# Patient Record
Sex: FEMALE | Race: WHITE | NOT HISPANIC OR LATINO | Employment: FULL TIME | URBAN - METROPOLITAN AREA
[De-identification: names, ages, dates, MRNs, and addresses within clinical notes are randomized per-mention and may not be internally consistent; named-entity substitution may affect disease eponyms.]

---

## 2021-01-25 ENCOUNTER — TRANSCRIBE ORDERS (OUTPATIENT)
Dept: ADMINISTRATIVE | Age: 26
End: 2021-01-25

## 2021-01-25 ENCOUNTER — LAB (OUTPATIENT)
Dept: LAB | Age: 26
End: 2021-01-25

## 2021-01-25 DIAGNOSIS — Z00.00 ROUTINE GENERAL MEDICAL EXAMINATION AT A HEALTH CARE FACILITY: Primary | ICD-10-CM

## 2021-01-25 DIAGNOSIS — Z00.00 ROUTINE GENERAL MEDICAL EXAMINATION AT A HEALTH CARE FACILITY: ICD-10-CM

## 2021-01-25 LAB — RUBV IGG SERPL IA-ACNC: 36.4 IU/ML

## 2021-01-25 PROCEDURE — 86762 RUBELLA ANTIBODY: CPT

## 2021-01-25 PROCEDURE — 86765 RUBEOLA ANTIBODY: CPT

## 2021-01-25 PROCEDURE — 86787 VARICELLA-ZOSTER ANTIBODY: CPT

## 2021-01-25 PROCEDURE — 86480 TB TEST CELL IMMUN MEASURE: CPT

## 2021-01-25 PROCEDURE — 86735 MUMPS ANTIBODY: CPT

## 2021-01-26 LAB
MEV IGG SER QL: NORMAL
MUV IGG SER QL: NORMAL
VZV IGG SER IA-ACNC: ABNORMAL

## 2021-01-27 LAB
GAMMA INTERFERON BACKGROUND BLD IA-ACNC: 0.02 IU/ML
M TB IFN-G BLD-IMP: NEGATIVE
M TB IFN-G CD4+ BCKGRND COR BLD-ACNC: 0 IU/ML
M TB IFN-G CD4+ BCKGRND COR BLD-ACNC: 0 IU/ML
MITOGEN IGNF BCKGRD COR BLD-ACNC: >10 IU/ML

## 2021-02-11 DIAGNOSIS — Z23 ENCOUNTER FOR IMMUNIZATION: ICD-10-CM

## 2021-02-15 ENCOUNTER — IMMUNIZATIONS (OUTPATIENT)
Dept: FAMILY MEDICINE CLINIC | Facility: HOSPITAL | Age: 26
End: 2021-02-15

## 2021-02-15 DIAGNOSIS — Z23 ENCOUNTER FOR IMMUNIZATION: Primary | ICD-10-CM

## 2021-02-15 PROCEDURE — 0011A SARS-COV-2 / COVID-19 MRNA VACCINE (MODERNA) 100 MCG: CPT

## 2021-02-15 PROCEDURE — 91301 SARS-COV-2 / COVID-19 MRNA VACCINE (MODERNA) 100 MCG: CPT

## 2021-03-25 ENCOUNTER — IMMUNIZATIONS (OUTPATIENT)
Dept: FAMILY MEDICINE CLINIC | Facility: HOSPITAL | Age: 26
End: 2021-03-25

## 2021-03-25 DIAGNOSIS — Z23 ENCOUNTER FOR IMMUNIZATION: Primary | ICD-10-CM

## 2021-03-25 PROCEDURE — 0012A SARS-COV-2 / COVID-19 MRNA VACCINE (MODERNA) 100 MCG: CPT

## 2021-03-25 PROCEDURE — 91301 SARS-COV-2 / COVID-19 MRNA VACCINE (MODERNA) 100 MCG: CPT

## 2022-09-24 ENCOUNTER — APPOINTMENT (OUTPATIENT)
Dept: RADIOLOGY | Facility: MEDICAL CENTER | Age: 27
End: 2022-09-24

## 2022-09-24 ENCOUNTER — HOSPITAL ENCOUNTER (OUTPATIENT)
Facility: HOSPITAL | Age: 27
Setting detail: OBSERVATION
Discharge: HOME | End: 2022-09-24
Attending: EMERGENCY MEDICINE | Admitting: INTERNAL MEDICINE

## 2022-09-24 ENCOUNTER — APPOINTMENT (OUTPATIENT)
Dept: RADIOLOGY | Facility: HOSPITAL | Age: 27
End: 2022-09-24

## 2022-09-24 VITALS
HEIGHT: 65 IN | WEIGHT: 129.63 LBS | TEMPERATURE: 98 F | SYSTOLIC BLOOD PRESSURE: 119 MMHG | RESPIRATION RATE: 16 BRPM | DIASTOLIC BLOOD PRESSURE: 67 MMHG | BODY MASS INDEX: 21.6 KG/M2 | HEART RATE: 81 BPM | OXYGEN SATURATION: 100 %

## 2022-09-24 DIAGNOSIS — R00.2 PALPITATIONS: Primary | ICD-10-CM

## 2022-09-24 DIAGNOSIS — R00.0 SINUS TACHYCARDIA: ICD-10-CM

## 2022-09-24 DIAGNOSIS — I47.1 NARROW COMPLEX TACHYCARDIA: Primary | ICD-10-CM

## 2022-09-24 PROBLEM — Z30.41 USES ORAL CONTRACEPTION: Status: ACTIVE | Noted: 2022-09-24

## 2022-09-24 PROBLEM — R79.89 ELEVATED TSH: Status: ACTIVE | Noted: 2022-09-24

## 2022-09-24 PROBLEM — E87.6 HYPOKALEMIA: Status: ACTIVE | Noted: 2022-09-24

## 2022-09-24 LAB
2HR DELTA HS TROPONIN: 5 NG/L
4HR DELTA HS TROPONIN: 6 NG/L
ANION GAP SERPL CALCULATED.3IONS-SCNC: 6 MMOL/L (ref 4–13)
ANION GAP SERPL CALCULATED.3IONS-SCNC: 9 MMOL/L (ref 4–13)
ATRIAL RATE: 124 BPM
ATRIAL RATE: 136 BPM
ATRIAL RATE: 147 BPM
BASOPHILS # BLD AUTO: 0.08 THOUSANDS/ΜL (ref 0–0.1)
BASOPHILS # BLD AUTO: 0.11 THOUSANDS/ΜL (ref 0–0.1)
BASOPHILS NFR BLD AUTO: 1 % (ref 0–1)
BASOPHILS NFR BLD AUTO: 1 % (ref 0–1)
BUN SERPL-MCNC: 10 MG/DL (ref 5–25)
BUN SERPL-MCNC: 14 MG/DL (ref 5–25)
CALCIUM SERPL-MCNC: 9.2 MG/DL (ref 8.3–10.1)
CALCIUM SERPL-MCNC: 9.7 MG/DL (ref 8.3–10.1)
CARDIAC TROPONIN I PNL SERPL HS: 2 NG/L
CARDIAC TROPONIN I PNL SERPL HS: 7 NG/L
CARDIAC TROPONIN I PNL SERPL HS: 8 NG/L
CHLORIDE SERPL-SCNC: 104 MMOL/L (ref 96–108)
CHLORIDE SERPL-SCNC: 110 MMOL/L (ref 96–108)
CO2 SERPL-SCNC: 25 MMOL/L (ref 21–32)
CO2 SERPL-SCNC: 26 MMOL/L (ref 21–32)
CREAT SERPL-MCNC: 0.65 MG/DL (ref 0.6–1.3)
CREAT SERPL-MCNC: 0.73 MG/DL (ref 0.6–1.3)
D DIMER PPP FEU-MCNC: <0.27 UG/ML FEU
EOSINOPHIL # BLD AUTO: 0.04 THOUSAND/ΜL (ref 0–0.61)
EOSINOPHIL # BLD AUTO: 0.11 THOUSAND/ΜL (ref 0–0.61)
EOSINOPHIL NFR BLD AUTO: 1 % (ref 0–6)
EOSINOPHIL NFR BLD AUTO: 1 % (ref 0–6)
ERYTHROCYTE [DISTWIDTH] IN BLOOD BY AUTOMATED COUNT: 12.2 % (ref 11.6–15.1)
ERYTHROCYTE [DISTWIDTH] IN BLOOD BY AUTOMATED COUNT: 12.2 % (ref 11.6–15.1)
EXT PREG TEST URINE: NEGATIVE
EXT. CONTROL ED NAV: NORMAL
GFR SERPL CREATININE-BSD FRML MDRD: 114 ML/MIN/1.73SQ M
GFR SERPL CREATININE-BSD FRML MDRD: 123 ML/MIN/1.73SQ M
GLUCOSE SERPL-MCNC: 108 MG/DL (ref 65–140)
GLUCOSE SERPL-MCNC: 120 MG/DL (ref 65–140)
HCT VFR BLD AUTO: 34.9 % (ref 34.8–46.1)
HCT VFR BLD AUTO: 37.5 % (ref 34.8–46.1)
HGB BLD-MCNC: 11.4 G/DL (ref 11.5–15.4)
HGB BLD-MCNC: 12.4 G/DL (ref 11.5–15.4)
IMM GRANULOCYTES # BLD AUTO: 0.04 THOUSAND/UL (ref 0–0.2)
IMM GRANULOCYTES # BLD AUTO: 0.04 THOUSAND/UL (ref 0–0.2)
IMM GRANULOCYTES NFR BLD AUTO: 0 % (ref 0–2)
IMM GRANULOCYTES NFR BLD AUTO: 1 % (ref 0–2)
LYMPHOCYTES # BLD AUTO: 1.85 THOUSANDS/ΜL (ref 0.6–4.47)
LYMPHOCYTES # BLD AUTO: 4.15 THOUSANDS/ΜL (ref 0.6–4.47)
LYMPHOCYTES NFR BLD AUTO: 21 % (ref 14–44)
LYMPHOCYTES NFR BLD AUTO: 37 % (ref 14–44)
MCH RBC QN AUTO: 29.2 PG (ref 26.8–34.3)
MCH RBC QN AUTO: 29.6 PG (ref 26.8–34.3)
MCHC RBC AUTO-ENTMCNC: 32.7 G/DL (ref 31.4–37.4)
MCHC RBC AUTO-ENTMCNC: 33.1 G/DL (ref 31.4–37.4)
MCV RBC AUTO: 90 FL (ref 82–98)
MCV RBC AUTO: 90 FL (ref 82–98)
MONOCYTES # BLD AUTO: 0.4 THOUSAND/ΜL (ref 0.17–1.22)
MONOCYTES # BLD AUTO: 0.7 THOUSAND/ΜL (ref 0.17–1.22)
MONOCYTES NFR BLD AUTO: 5 % (ref 4–12)
MONOCYTES NFR BLD AUTO: 6 % (ref 4–12)
NEUTROPHILS # BLD AUTO: 6.2 THOUSANDS/ΜL (ref 1.85–7.62)
NEUTROPHILS # BLD AUTO: 6.36 THOUSANDS/ΜL (ref 1.85–7.62)
NEUTS SEG NFR BLD AUTO: 55 % (ref 43–75)
NEUTS SEG NFR BLD AUTO: 71 % (ref 43–75)
NRBC BLD AUTO-RTO: 0 /100 WBCS
NRBC BLD AUTO-RTO: 0 /100 WBCS
P AXIS: 31 DEGREES
P AXIS: 64 DEGREES
PLATELET # BLD AUTO: 304 THOUSANDS/UL (ref 149–390)
PLATELET # BLD AUTO: 367 THOUSANDS/UL (ref 149–390)
PMV BLD AUTO: 10.8 FL (ref 8.9–12.7)
PMV BLD AUTO: 10.8 FL (ref 8.9–12.7)
POTASSIUM SERPL-SCNC: 3 MMOL/L (ref 3.5–5.3)
POTASSIUM SERPL-SCNC: 4.1 MMOL/L (ref 3.5–5.3)
PR INTERVAL: 134 MS
PR INTERVAL: 296 MS
QRS AXIS: 83 DEGREES
QRS AXIS: 86 DEGREES
QRS AXIS: 88 DEGREES
QRSD INTERVAL: 86 MS
QRSD INTERVAL: 88 MS
QRSD INTERVAL: 98 MS
QT INTERVAL: 280 MS
QT INTERVAL: 294 MS
QT INTERVAL: 300 MS
QTC INTERVAL: 431 MS
QTC INTERVAL: 438 MS
QTC INTERVAL: 442 MS
RBC # BLD AUTO: 3.9 MILLION/UL (ref 3.81–5.12)
RBC # BLD AUTO: 4.19 MILLION/UL (ref 3.81–5.12)
SODIUM SERPL-SCNC: 139 MMOL/L (ref 135–147)
SODIUM SERPL-SCNC: 141 MMOL/L (ref 135–147)
T WAVE AXIS: -41 DEGREES
T WAVE AXIS: -41 DEGREES
T WAVE AXIS: 33 DEGREES
TSH SERPL DL<=0.05 MIU/L-ACNC: 6.18 UIU/ML (ref 0.45–4.5)
VENTRICULAR RATE: 124 BPM
VENTRICULAR RATE: 136 BPM
VENTRICULAR RATE: 147 BPM
WBC # BLD AUTO: 11.31 THOUSAND/UL (ref 4.31–10.16)
WBC # BLD AUTO: 8.77 THOUSAND/UL (ref 4.31–10.16)

## 2022-09-24 PROCEDURE — 93010 ELECTROCARDIOGRAM REPORT: CPT | Performed by: INTERNAL MEDICINE

## 2022-09-24 PROCEDURE — 96374 THER/PROPH/DIAG INJ IV PUSH: CPT

## 2022-09-24 PROCEDURE — 84484 ASSAY OF TROPONIN QUANT: CPT

## 2022-09-24 PROCEDURE — 99285 EMERGENCY DEPT VISIT HI MDM: CPT

## 2022-09-24 PROCEDURE — 99285 EMERGENCY DEPT VISIT HI MDM: CPT | Performed by: EMERGENCY MEDICINE

## 2022-09-24 PROCEDURE — 85379 FIBRIN DEGRADATION QUANT: CPT

## 2022-09-24 PROCEDURE — 80048 BASIC METABOLIC PNL TOTAL CA: CPT

## 2022-09-24 PROCEDURE — 84443 ASSAY THYROID STIM HORMONE: CPT

## 2022-09-24 PROCEDURE — 85025 COMPLETE CBC W/AUTO DIFF WBC: CPT

## 2022-09-24 PROCEDURE — 81025 URINE PREGNANCY TEST: CPT

## 2022-09-24 PROCEDURE — 71045 X-RAY EXAM CHEST 1 VIEW: CPT

## 2022-09-24 PROCEDURE — 93005 ELECTROCARDIOGRAM TRACING: CPT

## 2022-09-24 PROCEDURE — 96361 HYDRATE IV INFUSION ADD-ON: CPT

## 2022-09-24 PROCEDURE — 99204 OFFICE O/P NEW MOD 45 MIN: CPT | Performed by: INTERNAL MEDICINE

## 2022-09-24 PROCEDURE — 36415 COLL VENOUS BLD VENIPUNCTURE: CPT

## 2022-09-24 RX ORDER — DESOGESTREL AND ETHINYL ESTRADIOL 0.15-0.03
1 KIT ORAL EVERY MORNING
COMMUNITY
Start: 2022-09-14 | End: 2023-09-14

## 2022-09-24 RX ORDER — POTASSIUM CHLORIDE 20 MEQ/1
40 TABLET, EXTENDED RELEASE ORAL ONCE
Status: DISCONTINUED | OUTPATIENT
Start: 2022-09-24 | End: 2022-09-24

## 2022-09-24 RX ORDER — LORAZEPAM 2 MG/ML
0.5 INJECTION INTRAMUSCULAR ONCE
Status: COMPLETED | OUTPATIENT
Start: 2022-09-24 | End: 2022-09-24

## 2022-09-24 RX ORDER — POTASSIUM CHLORIDE 20MEQ/15ML
40 LIQUID (ML) ORAL ONCE
Status: COMPLETED | OUTPATIENT
Start: 2022-09-24 | End: 2022-09-24

## 2022-09-24 RX ORDER — POTASSIUM CHLORIDE 20 MEQ/1
20 TABLET, EXTENDED RELEASE ORAL ONCE
Status: COMPLETED | OUTPATIENT
Start: 2022-09-24 | End: 2022-09-24

## 2022-09-24 RX ADMIN — POTASSIUM CHLORIDE 40 MEQ: 20 SOLUTION ORAL at 03:23

## 2022-09-24 RX ADMIN — SODIUM CHLORIDE 1000 ML: 0.9 INJECTION, SOLUTION INTRAVENOUS at 02:10

## 2022-09-24 RX ADMIN — POTASSIUM CHLORIDE 20 MEQ: 1500 TABLET, EXTENDED RELEASE ORAL at 06:44

## 2022-09-24 RX ADMIN — LORAZEPAM 0.5 MG: 2 INJECTION INTRAMUSCULAR; INTRAVENOUS at 02:07

## 2022-09-24 NOTE — CONSULTS
Consult - Cardiology   Mira Sánchez 24 y o  female MRN: [id number]  Unit/Bed#: E4 -01 Encounter: [id number]        Reason For Consult:  tachycardia                 Assessment:  Regular, long RP tachycardia without sign of pre-excitation on non tachycardic ECG  Among differential diagnoses current data would suggest - atrial tachycardia, inappropriate sinus tachycardia, or possibly AVNRT    Plan:  · Check echocardiogram ~~> can be done as an outpatient, order placed   Would defer initiation of Rx at this point   Patient advised against stimulants or other potential triggers for tachyarrhythmia including alcohol   Outpatient referral to electrophysiology ~~> order placed      History Of Present Illness:  Mira Sánchez is a very pleasant 72-year-old with no known medical problems who 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  Last night, while at work, the patient had just sat down to do some computer charting when she had the acute recognition of her heart beating rapidly and forcefully  Be on recognition of her tachycardia she was otherwise asymptomatic denying any acute lightheadedness, near-syncope, syncope, chest discomfort, dyspnea, diaphoresis, or otherwise  She had never experienced anything like this before and was appropriately concerned  Coworkers had taken her pulse confirming a tachycardia for which she then went to the emergency department to be evaluated  An EKG was obtained showing a regular narrow complex tachycardia and she was admitted for further observation  As mentioned above the patient has not had any prior similar symptoms  She has no family history of dysrhythmia, sudden cardiac death, or other known cardiac problems  She is a fit young woman routinely exercises with her regimen typically including 30 minutes of aerobic activity on either a treadmill or elliptical which she tolerates without difficulty    She drinks modest (less than 20 oz) amounts of coffee on an infrequent basis  She has not drink any other caffeine based or other stimulant beverages, or use other stimulant product  Her alcohol consumption is modest   She takes only birth control which she states she has been taking for several months  In June of this year she did have COVID infection but did not require hospitalization  Since admission the following are noted:  TSH slightly abnormal (6 17) with presenting potassium of 4 1 later repeated at 4 1 with otherwise unremarkable chemistries Baseline ECG without evidence of pre-excitation  Since application of telemetry the patient has had heart rate variability with heart rate range 60s-160 and recurrent short-lived episodes of tachycardia  Somewhat interestingly she seems to have not had recognition of these instances  Fastest telemetry episode of tachycardia occurring around 0530 this morning shows regular tachycardia around 160 beats per minute with no abrupt onset or termination with some difficulty discerning P-wave (see example #1 below)  There other instances of regular long RP tachycardia where P-waves are clearly evident (see example #2), and some which may suggest P-wave morphology variability (see examples 3 and 4 below)        Past Medical History:        History reviewed  No pertinent past medical history  History reviewed  No pertinent surgical history  Allergy:        Allergies   Allergen Reactions    Diphenhydramine Hives       Medications:       Prior to Admission medications    Medication Sig Start Date End Date Taking? Authorizing Provider   desogestrel-ethinyl estradiol (APRI) 0 15-30 MG-MCG per tablet Take 1 tablet by mouth every morning 9/14/22 9/14/23 Yes Historical Provider, MD       Family History:     History reviewed  No pertinent family history       Social History:       Social History     Socioeconomic History    Marital status: Single     Spouse name: None    Number of children: None    Years of education: None    Highest education level: None   Occupational History    None   Tobacco Use    Smoking status: Never Smoker    Smokeless tobacco: Never Used   Vaping Use    Vaping Use: Never used   Substance and Sexual Activity    Alcohol use: Not Currently    Drug use: Never    Sexual activity: None   Other Topics Concern    None   Social History Narrative    None     Social Determinants of Health     Financial Resource Strain: Not on file   Food Insecurity: Not on file   Transportation Needs: No Transportation Needs    Lack of Transportation (Medical): No    Lack of Transportation (Non-Medical): No   Physical Activity: Not on file   Stress: Not on file   Social Connections: Not on file   Intimate Partner Violence: Not on file   Housing Stability: Not on file       ROS:  Symptoms per HPI  The remainder of the review of systems is negative    Exam:  General:  Alert, normally conversant, comfortable appearing  Head: Normocephalic, atraumatic  Eyes:  EOMI  Pupils - equal, round, reactive to accomodation  No icterus  Normal Conjunctiva  Oropharynx: Moist without lesion  Neck:  No gross bruit, JVD, thyromegaly, or lymphadenopathy  Heart:  Regular with controlled rate  No rub nor pathologic murmur  Lungs:  Clear without rales/rhonchi/wheeze  Abdomen:  Soft and nontender with normal bowel sounds  No organomegaly or mass  Lower Limbs:  No edema  Pulses[de-identified]  RLE - DP:  2+                 LLE - DP:  2+  Musculoskeletal: Independent movement of limbs observed, Formal ROM and strength eval not performed  Neurologic:    Oriented to: person, place, situation  Cranial Nerves: grossly intact - vision, smell, taste, and hearing were not tested       Motor function: grossly normal, symmetric   Sensation: Was not tested    Vitals:    09/24/22 0414 09/24/22 0427 09/24/22 0700 09/24/22 1100   BP: 136/86 126/81 121/71 119/67   BP Location: Right arm Right arm Right arm Right arm   Pulse: (!) 119 (!) 121 78 81 Resp:  18 16 16   Temp:  98 °F (36 7 °C) 98 4 °F (36 9 °C) 98 °F (36 7 °C)   TempSrc:  Temporal Temporal Temporal   SpO2: 100% 100% 98% 100%   Weight:  58 8 kg (129 lb 10 1 oz)     Height:  5' 5" (1 651 m)             DATA:      -----------    Telemetry:       Example #1         Example #2        Example #3      Example #4            -----------------------------------------------------------------------------------------------------------------------------------------------  Echocardiogram    -----------------------------------------------------------------------------------------------------------------------------------------------  Ischemic Testing:  Stress test         Catheterization    -----------------------------------------------------------------------------------------------------------------------------------------------  Weights: Wt Readings from Last 20 Encounters:   09/24/22 58 8 kg (129 lb 10 1 oz)   , Body mass index is 21 57 kg/m²           Lab Studies:               Results from last 7 days   Lab Units 09/24/22  0610 09/24/22  0208   WBC Thousand/uL 8 77 11 31*   HEMOGLOBIN g/dL 11 4* 12 4   HEMATOCRIT % 34 9 37 5   PLATELETS Thousands/uL 304 367   ,   Results from last 7 days   Lab Units 09/24/22  0610 09/24/22  0208   POTASSIUM mmol/L 4 1 3 0*   CHLORIDE mmol/L 110* 104   CO2 mmol/L 25 26   BUN mg/dL 10 14   CREATININE mg/dL 0 65 0 73   CALCIUM mg/dL 9 2 9 7

## 2022-09-24 NOTE — Clinical Note
Gay Brandiesonny was seen and treated in our emergency department on 9/24/2022  No restrictions            Diagnosis:     Rocky Garcia  may return to work on return date  She may return on this date: 09/26/2022         If you have any questions or concerns, please don't hesitate to call        Josey Rod, DO    ______________________________           _______________          _______________  Hospital Representative                              Date                                Time

## 2022-09-24 NOTE — DISCHARGE SUMMARY
[facility]  Discharge- Ernesto Dempsey 1995, 24 y o  female MRN: [id number]  Unit/Bed#: E4 -01 Encounter: [id number]  Primary Care Provider: No primary care provider on file  Date and time admitted to hospital: 9/24/2022  1:31 AM    Elevated TSH  Assessment & Plan  · TSH 6 178  · Suspect related to recent covid infection in august    Uses oral contraception  Assessment & Plan  · D-dimer negative   · Continue on oral contraceptives     Hypokalemia  Assessment & Plan  · Patient with potassium 3 0 given 40 mEq in ED   · Additional 20 mEq given   · Monitor BMP    * Inappropriate sinus tachycardia  Assessment & Plan  · 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 found to have -140s  · Patient was given IVF and ativan in ED without resolution of tachycardia   · Tachycardia likely unrelated to anxiety, volume depletion, or infection  · Monitor on tele - sinus tachycardia until 5 am with conversion to normal sinus rhythm  · Cardiology recommendations noted  Suspect atrial tachycardia, inappropriate sinus tachycardia, or possibly AVNRT at this time  Requesting that patient undergo outpatient TTE, EP follow-up (orders placed by Cardiology service)  · Advised to avoid any stimulants to prevent recurrence      Medical Problems             Resolved Problems  Date Reviewed: 9/24/2022   None               Discharging Physician / Practitioner: Yenny Gonsalez MD  PCP: No primary care provider on file    Admission Date:   Admission Orders (From admission, onward)     Ordered        09/24/22 0400  Place in Observation  Once                      Discharge Date: 09/24/22    Consultations During Hospital Stay:  · Cardiology    Procedures Performed:   · None    Significant Findings / Test Results:   XR chest 1 view portable   ED Interpretation by Ryan Hartman DO (09/24 3973)   NO acute cardiopulmonary disease as read by me Final Result by Davonte Haile MD (09/24 1649)      No radiographic evidence of acute intrathoracic process  Workstation performed: [id number]             Incidental Findings:   · None     Test Results Pending at Discharge (will require follow up): · None     Outpatient Tests Requested:  · TTE    Complications:  None    Reason for Admission:  Sinus tachycardia    Hospital Course:   Esther Dolan is a 24 y o  female patient who originally presented to the hospital on 9/24/2022 due to persistent palpitations  Monitored on telemetry which revealed sinus tachycardia at 5:00 a m  Morning of admission and spontaneous resolution and conversion back to normal sinus rhythm  Patient asymptomatic at the time of encounter  Cardiology consulted on the case and considering possible atrial tachycardia, inappropriate sinus tachycardia, or possibly AVNRT as potential causes of patient's symptoms  No further inpatient testing as per Cardiology  Patient requested to undergo outpatient TTE and advised to follow-up with Electrophysiology (referral ordered by Cardiology)  Patient is stable for discharge home at this time  Patient also advised follow-up with her primary care provider within 7 days of discharge  Please see above list of diagnoses and related plan for additional information  Condition at Discharge: stable    Discharge Day Visit / Exam:   Subjective:  Patient seen and examined  Denies any complaints at this time  Endorses resolution of palpitations  Denies any lightheadedness or chest pain or shortness of breath      Vitals: Blood Pressure: 119/67 (09/24/22 1100)  Pulse: 81 (09/24/22 1100)  Temperature: 98 °F (36 7 °C) (09/24/22 1100)  Temp Source: Temporal (09/24/22 1100)  Respirations: 16 (09/24/22 1100)  Height: 5' 5" (165 1 cm) (09/24/22 0427)  Weight - Scale: 58 8 kg (129 lb 10 1 oz) (09/24/22 0427)  SpO2: 100 % (09/24/22 1100)    Exam:   Physical Exam  Vitals and nursing note reviewed  Constitutional:       General: She is not in acute distress  Appearance: She is well-developed  HENT:      Head: Normocephalic and atraumatic  Eyes:      Conjunctiva/sclera: Conjunctivae normal    Cardiovascular:      Rate and Rhythm: Normal rate and regular rhythm  Heart sounds: No murmur heard  Pulmonary:      Effort: Pulmonary effort is normal  No respiratory distress  Breath sounds: Normal breath sounds  Abdominal:      Palpations: Abdomen is soft  Tenderness: There is no abdominal tenderness  Musculoskeletal:      Cervical back: Neck supple  Skin:     General: Skin is warm and dry  Neurological:      Mental Status: She is alert  Discussion with Family: Updated  (significant other and father) at bedside  Discharge instructions/Information to patient and family:   See after visit summary for information provided to patient and family  Provisions for Follow-Up Care:  See after visit summary for information related to follow-up care and any pertinent home health orders  Disposition:   Home    Planned Readmission:  None     Discharge Statement:  I spent 45 minutes discharging the patient  This time was spent on the day of discharge  I had direct contact with the patient on the day of discharge  Greater than 50% of the total time was spent examining patient, answering all patient questions, arranging and discussing plan of care with patient as well as directly providing post-discharge instructions  Additional time then spent on discharge activities  Discharge Medications:  See after visit summary for reconciled discharge medications provided to patient and/or family        **Please Note: This note may have been constructed using a voice recognition system**

## 2022-09-24 NOTE — ED PROVIDER NOTES
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, and while she was charting on the patient, she reports feeling sudden onset palpitations described as heart fluttering and pounding  Patient became very anxious, mildly breathless, with trembling of her upper extremities  She told her supervisor and she was referred to the ED for further treatment  Patient states that she does have infrequent anxiety attacks, but never this severe  Patient does note that she started birth control 3 months ago  She currently denies any chest pain or pleuritic symptoms, difficulty breathing  She reports no history of thyroid disorders  Patient admits to feeling anxious but does not identify potential trigger  She reports no history recent viral illnesses, abdominal pain, nausea, vomiting, diarrhea, pregnancy, urinary complaints  Also denies calf pain or swelling, recent long car rides or plane flights, history of cancer or active chemotherapy treatments, prior clotting disorder, prolonged immobilization, recent surgery  No family history of blood clot disorders or cardiac disease  No history of recent syncope  Prior to Admission Medications   Prescriptions Last Dose Informant Patient Reported? Taking?   desogestrel-ethinyl estradiol (APRI) 0 15-30 MG-MCG per tablet   Yes Yes   Sig: Take 1 tablet by mouth every morning      Facility-Administered Medications: None       History reviewed  No pertinent past medical history  History reviewed  No pertinent surgical history  History reviewed   No pertinent family history  I have reviewed and agree with the history as documented  E-Cigarette/Vaping    E-Cigarette Use Never User      E-Cigarette/Vaping Substances     Social History     Tobacco Use    Smoking status: Never Smoker    Smokeless tobacco: Never Used   Vaping Use    Vaping Use: Never used   Substance Use Topics    Alcohol use: Not Currently    Drug use: Never        Review of Systems   Constitutional: Negative for activity change, appetite change, chills, fatigue and fever  HENT: Negative for congestion, ear pain, sinus pressure, sinus pain and sore throat  Eyes: Negative for pain and visual disturbance  Respiratory: Negative for cough, chest tightness and shortness of breath  Cardiovascular: Positive for palpitations  Negative for chest pain and leg swelling  Gastrointestinal: Negative for abdominal pain, diarrhea, nausea and vomiting  Genitourinary: Negative for difficulty urinating, dysuria, flank pain, hematuria and urgency  Musculoskeletal: Negative for arthralgias and back pain  Skin: Negative for color change and rash  Neurological: Negative for seizures and syncope  Psychiatric/Behavioral: The patient is nervous/anxious  All other systems reviewed and are negative  Physical Exam  ED Triage Vitals   Temperature Pulse Respirations Blood Pressure SpO2   09/24/22 0245 09/24/22 0136 09/24/22 0136 09/24/22 0136 09/24/22 0136   98 1 °F (36 7 °C) (!) 140 18 149/92 100 %      Temp Source Heart Rate Source Patient Position - Orthostatic VS BP Location FiO2 (%)   09/24/22 0245 09/24/22 0136 09/24/22 0136 09/24/22 0136 --   Oral Monitor Lying Right arm       Pain Score       09/24/22 0136       No Pain             Orthostatic Vital Signs  Vitals:    09/24/22 0305 09/24/22 0315 09/24/22 0414 09/24/22 0427   BP:  135/94 136/86 126/81   Pulse: (!) 130 (!) 138 (!) 119 (!) 121   Patient Position - Orthostatic VS: Lying Lying  Lying       Physical Exam  Vitals and nursing note reviewed  Constitutional:       General: She is not in acute distress  Appearance: Normal appearance  She is well-developed and normal weight  She is not ill-appearing or toxic-appearing  Comments: Anxious     HENT:      Head: Normocephalic and atraumatic  Right Ear: External ear normal       Left Ear: External ear normal       Nose: Nose normal  No congestion  Mouth/Throat:      Mouth: Mucous membranes are moist       Pharynx: Oropharynx is clear  Eyes:      General: No scleral icterus  Extraocular Movements: Extraocular movements intact  Conjunctiva/sclera: Conjunctivae normal       Pupils: Pupils are equal, round, and reactive to light  Cardiovascular:      Rate and Rhythm: Regular rhythm  Tachycardia present  Pulses: Normal pulses  Heart sounds: Normal heart sounds  No murmur heard  Pulmonary:      Effort: Pulmonary effort is normal  No respiratory distress  Breath sounds: Normal breath sounds  No rhonchi  Chest:      Chest wall: No tenderness  Abdominal:      General: Abdomen is flat  Palpations: Abdomen is soft  Tenderness: There is no abdominal tenderness  Musculoskeletal:         General: No tenderness  Normal range of motion  Cervical back: Normal range of motion and neck supple  Skin:     General: Skin is warm and dry  Capillary Refill: Capillary refill takes less than 2 seconds  Findings: No erythema or rash  Neurological:      General: No focal deficit present  Mental Status: She is alert and oriented to person, place, and time  Cranial Nerves: No cranial nerve deficit  Sensory: No sensory deficit  Motor: No weakness  Coordination: Coordination normal    Psychiatric:         Mood and Affect: Mood is anxious           ED Medications  Medications   potassium chloride (K-DUR,KLOR-CON) CR tablet 20 mEq (has no administration in time range)   sodium chloride 0 9 % bolus 1,000 mL (0 mL Intravenous Stopped 9/24/22 0567)   LORazepam (ATIVAN) injection 0 5 mg (0 5 mg Intravenous Given 9/24/22 0207)   potassium chloride oral solution 40 mEq (40 mEq Oral Given 9/24/22 0323)       Diagnostic Studies  Results Reviewed     Procedure Component Value Units Date/Time    HS Troponin I 2hr [[id number]]  (Normal) Collected: 09/24/22 0417    Lab Status: Final result Specimen: Blood from Arm, Right Updated: 09/24/22 0451     hs TnI 2hr 7 ng/L      Delta 2hr hsTnI 5 ng/L     HS Troponin I 4hr [[id number]]     Lab Status: No result Specimen: Blood     Basic metabolic panel [[id number]]  (Abnormal) Collected: 09/24/22 0208    Lab Status: Final result Specimen: Blood from Arm, Left Updated: 09/24/22 0300     Sodium 139 mmol/L      Potassium 3 0 mmol/L      Chloride 104 mmol/L      CO2 26 mmol/L      ANION GAP 9 mmol/L      BUN 14 mg/dL      Creatinine 0 73 mg/dL      Glucose 108 mg/dL      Calcium 9 7 mg/dL      eGFR 114 ml/min/1 73sq m     Narrative:      [place] guidelines for Chronic Kidney Disease (CKD):     Stage 1 with normal or high GFR (GFR > 90 mL/min/1 73 square meters)    Stage 2 Mild CKD (GFR = 60-89 mL/min/1 73 square meters)    Stage 3A Moderate CKD (GFR = 45-59 mL/min/1 73 square meters)    Stage 3B Moderate CKD (GFR = 30-44 mL/min/1 73 square meters)    Stage 4 Severe CKD (GFR = 15-29 mL/min/1 73 square meters)    Stage 5 End Stage CKD (GFR <15 mL/min/1 73 square meters)  Note: GFR calculation is accurate only with a steady state creatinine    TSH [[id number]]  (Abnormal) Collected: 09/24/22 0208    Lab Status: Final result Specimen: Blood from Arm, Left Updated: 09/24/22 0300     TSH 3RD GENERATON 6 178 uIU/mL     Narrative:      Patients undergoing fluorescein dye angiography may retain small amounts of fluorescein in the body for 48-72 hours post procedure  Samples containing fluorescein can produce falsely depressed TSH values   If the patient had this procedure,a specimen should be resubmitted post fluorescein clearance  D-Dimer [[id number]]  (Normal) Collected: 09/24/22 0208    Lab Status: Final result Specimen: Blood from Arm, Left Updated: 09/24/22 0250     D-Dimer, Quant <0 27 ug/ml FEU     HS Troponin 0hr (reflex protocol) [[id number]]  (Normal) Collected: 09/24/22 0213    Lab Status: Final result Specimen: Blood from Arm, Left Updated: 09/24/22 0242     hs TnI 0hr 2 ng/L     CBC and differential [[id number]]  (Abnormal) Collected: 09/24/22 0208    Lab Status: Final result Specimen: Blood from Arm, Left Updated: 09/24/22 0216     WBC 11 31 Thousand/uL      RBC 4 19 Million/uL      Hemoglobin 12 4 g/dL      Hematocrit 37 5 %      MCV 90 fL      MCH 29 6 pg      MCHC 33 1 g/dL      RDW 12 2 %      MPV 10 8 fL      Platelets 648 Thousands/uL      nRBC 0 /100 WBCs      Neutrophils Relative 55 %      Immat GRANS % 0 %      Lymphocytes Relative 37 %      Monocytes Relative 6 %      Eosinophils Relative 1 %      Basophils Relative 1 %      Neutrophils Absolute 6 20 Thousands/µL      Immature Grans Absolute 0 04 Thousand/uL      Lymphocytes Absolute 4 15 Thousands/µL      Monocytes Absolute 0 70 Thousand/µL      Eosinophils Absolute 0 11 Thousand/µL      Basophils Absolute 0 11 Thousands/µL     POCT pregnancy, urine [[id number]]  (Normal) Resulted: 09/24/22 0214    Lab Status: Final result Updated: 09/24/22 0214     EXT PREG TEST UR (Ref: Negative) negative     Control valid                 XR chest 1 view portable   ED Interpretation by Todd Hdz DO (09/24 0386)   NO acute cardiopulmonary disease as read by me            Procedures  Procedures      ED Course  ED Course as of 09/24/22 0553   Sat Sep 24, 2022   0245 hs TnI 0hr: 2   0315 Potassium(!): 3 0  Will replete with PO    0322 D-Dimer, Quant: <0 27        EKG shows sinus tachycardia at 146 beats per minute, rate-related ST changes, normal axis, normal intervals      Given acute nature of patient's palpitations in the context of OCP use, there is concern for possible pulmonary embolism  Etiology could also include abnormalities of thyroid function, electrolyte imbalance, less likely to be ACS  Will do cardiac labs, D-dimer, electrolyte panel  Patient was given IV fluids and Ativan 0 5 mg IV for anxiety  On re-evaluation, patient resting comfortably  Remains tachycardic in the 120s-130s  No chest pain  Repeat EKG shows ST at 136 bpm,normal axis, rate related ST changes  Initial troponin is 2  Will do delta because onset of palpations within 3 hours of lab draw  Diagnostics reviewed  Patient is hypokalemic at 3 0, will replete with oral therapy  Her TSH is mildly elevated, possibly hypothyroid, but this would not explain patient's tachycardia  She remains tachycardic in the 120s to 130s  Oxygen saturation and blood pressure are normal   Her D-dimer is negative, excluding the the likelihood of PE  Chest x-ray shows no acute disease  Given patient's new onset tachycardia, will admit for cardiac evaluation  Patient is agreeable with this plan  Case was discussed with jakob, arrangements made for admission  SBIRT 20yo+    Flowsheet Row Most Recent Value   SBIRT (25 yo +)    In order to provide better care to our patients, we are screening all of our patients for alcohol and drug use  Would it be okay to ask you these screening questions?  No Filed at: 09/24/2022 0143                MDM    Disposition  Final diagnoses:   Palpitations   Sinus tachycardia     Time reflects when diagnosis was documented in both MDM as applicable and the Disposition within this note     Time User Action Codes Description Comment    9/24/2022  3:50 AM Jana Montaño [R00 2] Palpitations     9/24/2022  3:50 AM Jana Montaño [R00 0] Sinus tachycardia       ED Disposition     ED Disposition   Admit    Condition   Stable    Date/Time   Sat Sep 24, 2022  3:54 AM    Comment   Case was discussed with Perry Ybarra and the patient's admission status was agreed to be Admission Status: observation status to the [address] [place] [address] service  Follow-up Information    None         Current Discharge Medication List      CONTINUE these medications which have NOT CHANGED    Details   desogestrel-ethinyl estradiol (APRI) 0 15-30 MG-MCG per tablet Take 1 tablet by mouth every morning           No discharge procedures on file  PDMP Review     None           ED Provider  Attending physically available and evaluated Jaimehunter Hollidaydanyel SWARTZ managed the patient along with the ED Attending      Electronically Signed by         Jose Brooks DO  09/24/22 1053

## 2022-09-24 NOTE — DISCHARGE INSTRUCTIONS
You were admitted for possible atrial tachycardia, inappropriate sinus tachycardia, or possibly AVNRT  Cardiology is recommending that you continue to follow-up with them as outpatient and are recommending for you to undergo an outpatient echocardiogram (order has been placed into the system)  Given your symptoms, it is advisable that you avoid any food/beverages that can lead to excitation of your heart (stimulants) such as coffee, alcohol, etc  You have also been referred to electrophysiology to see if you would need further work-up, monitoring or intervention  Please see your PCP within 1 week of discharge

## 2022-09-24 NOTE — H&P
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  · Patient was given IVF and ativan in ED without resolution of tachycardia   · Tachycardia likely unrelated to anxiety, volume depletion, or infection  · Monitor on tele  · Cardiology consulted  · Suspect need for EP     Hypokalemia  Assessment & Plan  · Patient with potassium 3 0 given 40 mEq in ED   · Additional 20 mEq given   · Monitor BMP    Elevated TSH  Assessment & Plan  · TSH 6 178  · Suspect related to recent covid infection in august    Uses oral contraception  Assessment & Plan  · D-dimer negative   · Continue on oral contraceptives     VTE Pharmacologic Prophylaxis: VTE Score: 1 Low Risk (Score 0-2) - Encourage Ambulation  Code Status: Level 1 - Full Code   Discussion with family: Updated  (fiance) at bedside  Anticipated Length of Stay: Patient will be admitted on an observation basis with an anticipated length of stay of less than 2 midnights secondary to sinus tachycardia requiring monitoring on telementry and cardiac consult  Total Time for Visit, including Counseling / Coordination of Care: 60 minutes Greater than 50% of this total time spent on direct patient counseling and coordination of care  Chief Complaint: palpitations    History of Present Illness:  Ollie Raman is a 24 y o  female with no PMH who presents with sudden onset of palpitations  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 at 1:30 AM  Co-workers reported that the patient appeared pale   The palpitations continued until patient presented to the ED  Patient was found to be tachycardiac with heart rate in 110-140s  She was given ativan and IV fluids without resolution of tachycardia  She denies any history of anxiety or panic attacks  She states having felt palpitations before but never for the length of time experienced today  She denies any associated symptoms with episode such as nausea, vomiting, blurred vision,dizziness, chest pain, SOB  Review of Systems:  Review of Systems   Constitutional: Negative for chills and fever  HENT: Negative for sinus pain, sneezing and sore throat  Eyes: Negative for visual disturbance  Respiratory: Negative for shortness of breath  Cardiovascular: Positive for palpitations  Negative for chest pain  Gastrointestinal: Negative for abdominal pain, nausea and vomiting  Neurological: Negative for dizziness and light-headedness  Psychiatric/Behavioral: The patient is not nervous/anxious  Past Medical and Surgical History:   History reviewed  No pertinent past medical history  History reviewed  No pertinent surgical history  Meds/Allergies:  Prior to Admission medications    Medication Sig Start Date End Date Taking? Authorizing Provider   desogestrel-ethinyl estradiol (APRI) 0 15-30 MG-MCG per tablet Take 1 tablet by mouth every morning 9/14/22 9/14/23 Yes Historical Provider, MD     I have reviewed home medications with patient personally  Allergies:    Allergies   Allergen Reactions    Diphenhydramine Hives       Social History:  Marital Status: Single   Occupation: OB nurse  Patient Pre-hospital Living Situation: Home with [facility]  Patient Pre-hospital Level of Mobility: walks  Patient Pre-hospital Diet Restrictions: none  Substance Use History:   Social History     Substance and Sexual Activity   Alcohol Use Not Currently     Social History     Tobacco Use   Smoking Status Never Smoker   Smokeless Tobacco Never Used     Social History     Substance and Sexual Activity   Drug Use Never Family History:  History reviewed  No pertinent family history  Physical Exam:     Vitals:   Blood Pressure: 136/86 (09/24/22 0414)  Pulse: (!) 119 (09/24/22 0414)  Temperature: 98 1 °F (36 7 °C) (09/24/22 0245)  Temp Source: Oral (09/24/22 0245)  Respirations: 16 (09/24/22 0315)  Weight - Scale: 60 7 kg (133 lb 13 1 oz) (09/24/22 0136)  SpO2: 100 % (09/24/22 0414)    Physical Exam  Constitutional:       General: She is not in acute distress  Appearance: Normal appearance  HENT:      Head: Normocephalic and atraumatic  Eyes:      General: No scleral icterus  Cardiovascular:      Rate and Rhythm: Tachycardia present  Pulmonary:      Effort: Pulmonary effort is normal       Breath sounds: Normal breath sounds  No wheezing  Abdominal:      General: Abdomen is flat  Bowel sounds are normal       Palpations: Abdomen is soft  Tenderness: There is no abdominal tenderness  There is no guarding  Musculoskeletal:      Right lower leg: No edema  Left lower leg: No edema  Skin:     General: Skin is warm and dry  Neurological:      Mental Status: She is alert  Mental status is at baseline     Psychiatric:         Mood and Affect: Mood normal          Behavior: Behavior normal           Additional Data:     Lab Results:  Results from last 7 days   Lab Units 09/24/22  0208   WBC Thousand/uL 11 31*   HEMOGLOBIN g/dL 12 4   HEMATOCRIT % 37 5   PLATELETS Thousands/uL 367   NEUTROS PCT % 55   LYMPHS PCT % 37   MONOS PCT % 6   EOS PCT % 1     Results from last 7 days   Lab Units 09/24/22  0208   SODIUM mmol/L 139   POTASSIUM mmol/L 3 0*   CHLORIDE mmol/L 104   CO2 mmol/L 26   BUN mg/dL 14   CREATININE mg/dL 0 73   ANION GAP mmol/L 9   CALCIUM mg/dL 9 7   GLUCOSE RANDOM mg/dL 108                       Imaging: Reviewed radiology reports from this admission including: chest xray  XR chest 1 view portable   ED Interpretation by Ish Kimble DO (09/24 4236)   NO acute cardiopulmonary disease as read by me          EKG and Other Studies Reviewed on Admission:   · EKG: Sinus Tachycardia    ** Please Note: This note has been constructed using a voice recognition system   **

## 2022-09-24 NOTE — PLAN OF CARE
Problem: CARDIOVASCULAR - ADULT  Goal: Maintains optimal cardiac output and hemodynamic stability  Description: INTERVENTIONS:  - Monitor I/O, vital signs and rhythm  - Monitor for S/S and trends of decreased cardiac output  - Administer and titrate ordered vasoactive medications to optimize hemodynamic stability  - Assess quality of pulses, skin color and temperature  - Assess for signs of decreased coronary artery perfusion  - Instruct patient to report change in severity of symptoms  Outcome: Progressing  Goal: Absence of cardiac dysrhythmias or at baseline rhythm  Description: INTERVENTIONS:  - Continuous cardiac monitoring, vital signs, obtain 12 lead EKG if ordered  - Administer antiarrhythmic and heart rate control medications as ordered  - Monitor electrolytes and administer replacement therapy as ordered  Outcome: Progressing     Problem: METABOLIC, FLUID AND ELECTROLYTES - ADULT  Goal: Electrolytes maintained within normal limits  Description: INTERVENTIONS:  - Monitor labs and assess patient for signs and symptoms of electrolyte imbalances  - Administer electrolyte replacement as ordered  - Monitor response to electrolyte replacements, including repeat lab results as appropriate  - Instruct patient on fluid and nutrition as appropriate  Outcome: Progressing

## 2022-09-24 NOTE — ASSESSMENT & PLAN NOTE
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

## 2022-09-24 NOTE — ED ATTENDING ATTESTATION
2022  I, Darrold Shone, DO, saw and evaluated the patient  I have discussed the patient with the resident/non-physician practitioner and agree with the resident's/non-physician practitioner's findings, Plan of Care, and MDM as documented in the resident's/non-physician practitioner's note, except where noted  All available labs and Radiology studies were reviewed  I was present for key portions of any procedure(s) performed by the resident/non-physician practitioner and I was immediately available to provide assistance  At this point I agree with the current assessment done in the Emergency Department  I have conducted an independent evaluation of this patient a history and physical is as follows:    Michael SWARTZ DO, saw and evaluated the patient  All available labs and X-rays were reviewed  I discussed the patient with the resident / non-physician and agree with the resident's / non-physician practitioner's findings and plan as documented in the resident's / non-physician practicitioner's note, except where noted  At this point, I agree with the current assessment done in the ED      NAME: Siomara Rand  AGE: 24 y o  SEX: female  : 1995   MRN: [id number]  ENCOUNTER: [id number]    DATE: 2022  TIME: 4:10 AM      History of Present Illness   Siomara Rand is a 24 y o  female who presents with Rapid Heart Rate (Patient arrives c/o sudden onset of palpitations  Patient denies dizziness at this time  No cardiac hx noted  )    has no past medical history on file        Past Medical History   History reviewed  No pertinent past medical history  Past Surgical History   History reviewed  No pertinent surgical history      Social History     Social History     Substance and Sexual Activity   Alcohol Use Not Currently     Social History     Substance and Sexual Activity   Drug Use Not on file     Social History     Tobacco Use   Smoking Status Never Smoker   Smokeless Tobacco Never Used Family History   History reviewed  No pertinent family history  Medications Prior to Admission     Prior to Admission medications    Medication Sig Start Date End Date Taking? Authorizing Provider   desogestrel-ethinyl estradiol (APRI) 0 15-30 MG-MCG per tablet Take 1 tablet by mouth every morning 9/14/22 9/14/23 Yes Historical Provider, MD       Allergies     Allergies   Allergen Reactions    Diphenhydramine Hives       Objective     Vitals:    09/24/22 0136 09/24/22 0245 09/24/22 0305 09/24/22 0315   BP: 149/92   135/94   BP Location: Right arm   Right arm   Pulse: (!) 140  (!) 130 (!) 138   Resp: 18  18 16   Temp:  98 1 °F (36 7 °C)     TempSrc:  Oral     SpO2: 100%  100% 98%   Weight: 60 7 kg (133 lb 13 1 oz)        There is no height or weight on file to calculate BMI    No intake or output data in the 24 hours ending 09/24/22 0410  Invasive Devices  Report    Peripheral Intravenous Line  Duration           Peripheral IV 09/24/22 Left Antecubital <1 day                Physical Exam  General: awake, alert, no acute distress  Head: normocephalic, atraumatic  Eyes: no scleral icterus  Ears: external ears normal, hearing grossly intact  Nose: external exam grossly normal  Neck: symmetric, No JVD noted, trachea midline  Pulmonary: no respiratory distress, no tachypnea noted, CTAB  Cardiovascular: appears well perfused, Tachycardia, No M/R/G  Abdomen: no distention noted  Musculoskeletal: no deformities noted, tone normal  Neuro: grossly non-focal  Psych: mood and affect appropriate    Lab Results:    Labs Reviewed   CBC AND DIFFERENTIAL - Abnormal       Result Value Ref Range Status    WBC 11 31 (*) 4 31 - 10 16 Thousand/uL Final    RBC 4 19  3 81 - 5 12 Million/uL Final    Hemoglobin 12 4  11 5 - 15 4 g/dL Final    Hematocrit 37 5  34 8 - 46 1 % Final    MCV 90  82 - 98 fL Final    MCH 29 6  26 8 - 34 3 pg Final    MCHC 33 1  31 4 - 37 4 g/dL Final    RDW 12 2  11 6 - 15 1 % Final    MPV 10 8  8 9 - 12 7 fL Final    Platelets 853  778 - 390 Thousands/uL Final    nRBC 0  /100 WBCs Final    Neutrophils Relative 55  43 - 75 % Final    Immat GRANS % 0  0 - 2 % Final    Lymphocytes Relative 37  14 - 44 % Final    Monocytes Relative 6  4 - 12 % Final    Eosinophils Relative 1  0 - 6 % Final    Basophils Relative 1  0 - 1 % Final    Neutrophils Absolute 6 20  1 85 - 7 62 Thousands/µL Final    Immature Grans Absolute 0 04  0 00 - 0 20 Thousand/uL Final    Lymphocytes Absolute 4 15  0 60 - 4 47 Thousands/µL Final    Monocytes Absolute 0 70  0 17 - 1 22 Thousand/µL Final    Eosinophils Absolute 0 11  0 00 - 0 61 Thousand/µL Final    Basophils Absolute 0 11 (*) 0 00 - 0 10 Thousands/µL Final   BASIC METABOLIC PANEL - Abnormal    Sodium 139  135 - 147 mmol/L Final    Potassium 3 0 (*) 3 5 - 5 3 mmol/L Final    Chloride 104  96 - 108 mmol/L Final    CO2 26  21 - 32 mmol/L Final    ANION GAP 9  4 - 13 mmol/L Final    BUN 14  5 - 25 mg/dL Final    Creatinine 0 73  0 60 - 1 30 mg/dL Final    Comment: Standardized to IDMS reference method    Glucose 108  65 - 140 mg/dL Final    Comment: If the patient is fasting, the ADA then defines impaired fasting glucose as > 100 mg/dL and diabetes as > or equal to 123 mg/dL  Specimen collection should occur prior to Sulfasalazine administration due to the potential for falsely depressed results  Specimen collection should occur prior to Sulfapyridine administration due to the potential for falsely elevated results      Calcium 9 7  8 3 - 10 1 mg/dL Final    eGFR 114  ml/min/1 73sq m Final    Narrative:     [place] guidelines for Chronic Kidney Disease (CKD):     Stage 1 with normal or high GFR (GFR > 90 mL/min/1 73 square meters)    Stage 2 Mild CKD (GFR = 60-89 mL/min/1 73 square meters)    Stage 3A Moderate CKD (GFR = 45-59 mL/min/1 73 square meters)    Stage 3B Moderate CKD (GFR = 30-44 mL/min/1 73 square meters)    Stage 4 Severe CKD (GFR = 15-29 mL/min/1 73 square meters)    Stage 5 End Stage CKD (GFR <15 mL/min/1 73 square meters)  Note: GFR calculation is accurate only with a steady state creatinine   TSH, 3RD GENERATION - Abnormal    TSH 3RD GENERATON 6 178 (*) 0 450 - 4 500 uIU/mL Final    Comment: The recommended reference ranges for TSH during pregnancy are as follows:   First trimester 0 1 to 2 5 uIU/mL   Second trimester  0 2 to 3 0 uIU/mL   Third trimester 0 3 to 3 0 uIU/m    Note: Normal ranges may not apply to patients who are transgender, non-binary, or whose legal sex, sex at birth, and gender identity differ  Adult TSH (3rd generation) reference range follows the recommended guidelines of the American Thy[employer] Association, January, 2020  Narrative:     Patients undergoing fluorescein dye angiography may retain small amounts of fluorescein in the body for 48-72 hours post procedure  Samples containing fluorescein can produce falsely depressed TSH values  If the patient had this procedure,a specimen should be resubmitted post fluorescein clearance  D-DIMER, QUANTITATIVE - Normal    D-Dimer, Quant <0 27  <0 50 ug/ml FEU Final    Comment: Reference and upper limits to exclude DVT and PE are the same  Do not use to exclude if clinical symptoms are present  Pregnant women:  1st trimester:  <0 22 - 1 06 ug/ml FEU  2nd trimester:  <0 22 - 1 88 ug/ml FEU  3rd trimester:   0 24 - 3 28 ug/ml FEU    Note: Normal ranges may not apply to patients who are transgender, non-binary, or whose legal sex, sex at birth, and gender identity differ  HS TROPONIN I 0HR - Normal    hs TnI 0hr 2  "Refer to ACS Flowchart"- see link ng/L Final    Comment:                                              Initial (time 0) result  If >=50 ng/L, Myocardial injury suggested ;  Type of myocardial injury and treatment strategy  to be determined  If 5-49 ng/L, a delta result at 2 hours and or 4 hours will be needed to further evaluate    If <4 ng/L, and chest pain has been >3 hours since onset, patient may qualify for discharge based on the HEART score in the ED  If <5 ng/L and <3hours since onset of chest pain, a delta result at 2 hours will be needed to further evaluate  HS Troponin 99th Percentile URL of a Health Population=12 ng/L with a 95% Confidence Interval of 8-18 ng/L  Second Troponin (time 2 hours)  If calculated delta >= 20 ng/L,  Myocardial injury suggested ; Type of myocardial injury and treatment strategy to be determined  If 5-49 ng/L and the calculated delta is 5-19 ng/L, consult medical service for evaluation  Continue evaluation for ischemia on ecg and other possible etiology and repeat hs troponin at 4 hours  If delta is <5 ng/L at 2 hours, consider discharge based on risk stratification via the HEART score (if in ED), or JUVENTINO risk score in IP/Observation  HS Troponin 99th Percentile URL of a Health Population=12 ng/L with a 95% Confidence Interval of 8-18 ng/L  POCT PREGNANCY, URINE - Normal    EXT PREG TEST UR (Ref: Negative) negative   Final    Control valid   Final   HS TROPONIN I 2HR   HS TROPONIN I 4HR         Imaging:   XR chest 1 view portable   ED Interpretation   NO acute cardiopulmonary disease as read by me            Medications given in Emergency Department     Medication Administration - last 24 hours from 09/23/2022 0410 to 09/24/2022 0410       Date/Time Order Dose Route Action Action by     09/24/2022 0210 sodium chloride 0 9 % bolus 1,000 mL 1,000 mL Intravenous [address] Mikhail Yarbrough RN     09/24/2022 0207 LORazepam (ATIVAN) injection 0 5 mg 0 5 mg Intravenous Given Tammy Mendenhall RN     09/24/2022 5030 potassium chloride oral solution 40 mEq 40 mEq Oral Given Sharla Valadez          Assessment and Plan  Palpitations  Tachycardia    Patient with abrupt onset of tachycardia while at work tonight  Patient is on birth control  EKG shows a sinus tachycardia    Will give IV fluids, IV Ativan, check labs including a D-dimer and troponin and observe on the monitor for response to treatment  Labs are overall normal with the exception of a mild hypokalemia  Patient does not have any GI losses or medications that would cause this  Will replace orally  Patient is still tachycardic  Blood pressure is stable and patient is not complaining of any chest pain, shortness of breath, lightheadedness, etcetera  Plan is to admit to Internal Medicine for further monitoring and treatment as needed  Active Problems:    * No active hospital problems  *      Final Diagnosis:  1  Palpitations    2   Sinus tachycardia        ED Course         Critical Care Time  Procedures

## 2022-09-24 NOTE — PLAN OF CARE
Problem: CARDIOVASCULAR - ADULT  Goal: Maintains optimal cardiac output and hemodynamic stability  Description: INTERVENTIONS:  - Monitor I/O, vital signs and rhythm  - Monitor for S/S and trends of decreased cardiac output  - Administer and titrate ordered vasoactive medications to optimize hemodynamic stability  - Assess quality of pulses, skin color and temperature  - Assess for signs of decreased coronary artery perfusion  - Instruct patient to report change in severity of symptoms  9/24/2022 1815 by Edna Muñoz RN  Outcome: Completed  9/24/2022 1146 by Edna Muñoz RN  Outcome: Progressing  Goal: Absence of cardiac dysrhythmias or at baseline rhythm  Description: INTERVENTIONS:  - Continuous cardiac monitoring, vital signs, obtain 12 lead EKG if ordered  - Administer antiarrhythmic and heart rate control medications as ordered  - Monitor electrolytes and administer replacement therapy as ordered  9/24/2022 1815 by Edna Muñoz RN  Outcome: Completed  9/24/2022 1146 by Edna Muñoz RN  Outcome: Progressing     Problem: METABOLIC, FLUID AND ELECTROLYTES - ADULT  Goal: Electrolytes maintained within normal limits  Description: INTERVENTIONS:  - Monitor labs and assess patient for signs and symptoms of electrolyte imbalances  - Administer electrolyte replacement as ordered  - Monitor response to electrolyte replacements, including repeat lab results as appropriate  - Instruct patient on fluid and nutrition as appropriate  9/24/2022 1815 by Edna Muñoz RN  Outcome: Completed  9/24/2022 1146 by Edna Muñoz RN  Outcome: Progressing

## 2022-09-24 NOTE — NURSING NOTE
Patient discharged, stable at this time  IV removed, AVS reviewed  No questions at this time  No belongings left in room, no paperwork left in chart

## 2022-09-24 NOTE — ASSESSMENT & PLAN NOTE
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

## 2022-09-25 LAB
ATRIAL RATE: 79 BPM
P AXIS: 68 DEGREES
PR INTERVAL: 156 MS
QRS AXIS: 74 DEGREES
QRSD INTERVAL: 80 MS
QT INTERVAL: 384 MS
QTC INTERVAL: 440 MS
T WAVE AXIS: 44 DEGREES
VENTRICULAR RATE: 79 BPM

## 2022-09-25 PROCEDURE — 93010 ELECTROCARDIOGRAM REPORT: CPT | Performed by: INTERNAL MEDICINE

## 2022-10-03 ENCOUNTER — OFFICE VISIT (OUTPATIENT)
Dept: FAMILY MEDICINE CLINIC | Facility: CLINIC | Age: 27
End: 2022-10-03
Payer: COMMERCIAL

## 2022-10-03 VITALS
HEIGHT: 65 IN | WEIGHT: 132 LBS | TEMPERATURE: 97.5 F | DIASTOLIC BLOOD PRESSURE: 68 MMHG | HEART RATE: 94 BPM | SYSTOLIC BLOOD PRESSURE: 126 MMHG | OXYGEN SATURATION: 100 % | BODY MASS INDEX: 21.99 KG/M2

## 2022-10-03 DIAGNOSIS — Z00.00 HEALTH CARE MAINTENANCE: Primary | ICD-10-CM

## 2022-10-03 DIAGNOSIS — R00.0 INCREASED HEART RATE: ICD-10-CM

## 2022-10-03 DIAGNOSIS — R03.0 ELEVATED SYSTOLIC BLOOD PRESSURE READING WITHOUT DIAGNOSIS OF HYPERTENSION: ICD-10-CM

## 2022-10-03 PROCEDURE — 99385 PREV VISIT NEW AGE 18-39: CPT | Performed by: FAMILY MEDICINE

## 2022-10-03 NOTE — PROGRESS NOTES
Name: Linda Flaherty      : 1995      MRN: [id number]  Encounter Provider: Alexander Law DO  Encounter Date: 10/3/2022   Encounter department: [address]     Discussed with Fernando Amaya no stimulants, including the BCP (can act as a stimulant elevating HR and/or BP)        Chief Complaint   Patient presents with   [address] Patient Visit     Recently in ED for rapid heart rate is having echo on 10/20/22     BMI Counseling: Body mass index is 21 97 kg/m²  The BMI     PHQ-2/9 Depression Screening    Little interest or pleasure in doing things: 0 - not at all  Feeling down, depressed, or hopeless: 0 - not at all         1  Health care maintenance    2  Increased heart rate    3  Elevated systolic blood pressure reading without diagnosis of hypertension        Depression Screening and Follow-up Plan: Patient not screened for depression due to medical reason (urgent/emergent situation, decreased capacity)  Pt denies depression  Subjective     First visit  Seen in ER for elevated HR  Usually HR's in the 37-75'G and systolic pressure around 179 (doesn't remember any diastolics)  SH: Neg tob or OH  PSH: Negative  CM: Stated BCP 3 months ago  Review of Systems   Constitutional: Negative  HENT: Negative  Eyes: Negative  Respiratory: Negative  Cardiovascular: Negative  Gastrointestinal: Negative  Genitourinary: Negative  Musculoskeletal: Negative  Skin: Negative  Neurological: Negative  Psychiatric/Behavioral: Negative  History reviewed  No pertinent past medical history  History reviewed  No pertinent surgical history    Family History   Problem Relation Age of Onset    No Known Problems Mother      Social History     Socioeconomic History    Marital status: Single     Spouse name: None    Number of children: None    Years of education: None    Highest education level: None   Occupational History    None   Tobacco Use    Smoking status: Never Smoker    Smokeless tobacco: Never Used   Vaping Use    Vaping Use: Never used   Substance and Sexual Activity    Alcohol use: Yes     Comment: social    Drug use: Never    Sexual activity: None   Other Topics Concern    None   Social History Narrative    None     Social Determinants of Health     Financial Resource Strain: Not on file   Food Insecurity: Not on file   Transportation Needs: No Transportation Needs    Lack of Transportation (Medical): No    Lack of Transportation (Non-Medical): No   Physical Activity: Not on file   Stress: Not on file   Social Connections: Not on file   Intimate Partner Violence: Not on file   Housing Stability: Not on file     Current Outpatient Medications on File Prior to Visit   Medication Sig    desogestrel-ethinyl estradiol (APRI) 0 15-30 MG-MCG per tablet Take 1 tablet by mouth every morning     Allergies   Allergen Reactions    Diphenhydramine Hives     Immunization History   Administered Date(s) Administered    COVID-19 MODERNA VACC 0 5 ML IM 02/15/2021, 03/25/2021       Objective     /68 (BP Location: Left arm, Patient Position: Sitting, Cuff Size: Standard)   Pulse 94   Temp 97 5 °F (36 4 °C) (Tympanic)   Ht 5' 5" (1 651 m)   Wt 59 9 kg (132 lb)   LMP 09/02/2022   SpO2 100%   BMI 21 97 kg/m²     Physical Exam  Constitutional:       Appearance: Normal appearance  She is well-developed  HENT:      Head: Normocephalic and atraumatic  Right Ear: Tympanic membrane, ear canal and external ear normal       Left Ear: Tympanic membrane, ear canal and external ear normal       Nose: Nose normal       Mouth/Throat:      Mouth: Mucous membranes are moist       Pharynx: Oropharynx is clear  Eyes:      Conjunctiva/sclera: Conjunctivae normal       Pupils: Pupils are equal, round, and reactive to light  Cardiovascular:      Rate and Rhythm: Normal rate and regular rhythm  Pulses: Normal pulses  Heart sounds: Normal heart sounds  No murmur heard  Pulmonary:      Effort: Pulmonary effort is normal       Breath sounds: Normal breath sounds  Abdominal:      General: Abdomen is flat  Bowel sounds are normal       Palpations: Abdomen is soft  Musculoskeletal:      Cervical back: Normal range of motion and neck supple  Skin:     General: Skin is warm and dry  Neurological:      General: No focal deficit present  Mental Status: She is alert and oriented to person, place, and time  Deep Tendon Reflexes: Reflexes are normal and symmetric  Psychiatric:         Mood and Affect: Mood normal          Behavior: Behavior normal          Thought Content:  Thought content normal          Judgment: Judgment normal        Jose Luis Butts, DO

## 2023-06-28 ENCOUNTER — OFFICE VISIT (OUTPATIENT)
Dept: FAMILY MEDICINE CLINIC | Facility: CLINIC | Age: 28
End: 2023-06-28
Payer: COMMERCIAL

## 2023-06-28 VITALS
BODY MASS INDEX: 21.86 KG/M2 | WEIGHT: 131.2 LBS | TEMPERATURE: 98.2 F | HEIGHT: 65 IN | DIASTOLIC BLOOD PRESSURE: 74 MMHG | OXYGEN SATURATION: 99 % | RESPIRATION RATE: 18 BRPM | SYSTOLIC BLOOD PRESSURE: 122 MMHG | HEART RATE: 69 BPM

## 2023-06-28 DIAGNOSIS — Z11.59 SCREENING FOR VIRAL DISEASE: ICD-10-CM

## 2023-06-28 DIAGNOSIS — E03.9 ACQUIRED HYPOTHYROIDISM: ICD-10-CM

## 2023-06-28 DIAGNOSIS — Z00.00 ANNUAL PHYSICAL EXAM: Primary | ICD-10-CM

## 2023-06-28 PROCEDURE — 99395 PREV VISIT EST AGE 18-39: CPT | Performed by: FAMILY MEDICINE

## 2023-06-28 NOTE — PROGRESS NOTES
[address]    NAME: Pina Armenta  AGE: 24 y.o. SEX: female  : 1995     DATE: 2023     Assessment and Plan:     Problem List Items Addressed This Visit    None  Visit Diagnoses     Annual physical exam    -  Primary    Relevant Orders    Basic metabolic panel    Lipid panel    CBC and differential    Screening for viral disease        Relevant Orders    Hepatitis C antibody    : HIV 1/2 AB/AG w Reflex [facility] for 2 yr old and above    Acquired hypothyroidism        Relevant Orders    TSH, 3rd generation          Immunizations and preventive care screenings were discussed with patient today. Appropriate education was printed on patient's after visit summary. Counseling:  Alcohol/drug use: discussed moderation in alcohol intake, the recommendations for healthy alcohol use, and avoidance of illicit drug use. Dental Health: discussed importance of regular tooth brushing, flossing, and dental visits. · Exercise: the importance of regular exercise/physical activity was discussed. Recommend exercise 3-5 times per week for at least 30 minutes. Depression Screening and Follow-up Plan: Patient was screened for depression during today's encounter. They screened negative with a PHQ-2 score of 0. Return if symptoms worsen or fail to improve. Chief Complaint:     Chief Complaint   Patient presents with   • Annual Exam      History of Present Illness:     Adult Annual Physical   Patient here for a comprehensive physical exam. The patient reports no problems. Diet and Physical Activity  · Diet/Nutrition: well balanced diet. · Exercise: moderate cardiovascular exercise.       Depression Screening  PHQ-2/9 Depression Screening    Little interest or pleasure in doing things: 0 - not at all  Feeling down, depressed, or hopeless: 0 - not at all  PHQ-2 Score: 0  PHQ-2 Interpretation: Negative depression screen       General Health  · Sleep: gets 7-8 hours of sleep on average. · Hearing: normal - bilateral.  · Vision: wears contacts. · Dental: regular dental visits. /GYN Health  · Last menstrual period: 6-23-23  ·   · Contraceptive method: oral contraceptives. · History of STDs?: no.     Review of Systems:     Review of Systems   Constitutional: Negative. HENT: Negative. Eyes: Negative. Respiratory: Negative. Cardiovascular: Negative. Gastrointestinal: Negative. Genitourinary: Negative. Musculoskeletal: Negative. Skin: Negative. Neurological: Negative. Psychiatric/Behavioral: Negative. Past Medical History:     History reviewed. No pertinent past medical history. Past Surgical History:     History reviewed. No pertinent surgical history. Social History:     Social History     Socioeconomic History   • Marital status: Single     Spouse name: None   • Number of children: None   • Years of education: None   • Highest education level: None   Occupational History   • None   Tobacco Use   • Smoking status: Never   • Smokeless tobacco: Never   Vaping Use   • Vaping Use: Never used   Substance and Sexual Activity   • Alcohol use: Yes     Comment: social   • Drug use: Never   • Sexual activity: Yes     Partners: Male   Other Topics Concern   • None   Social History Narrative   • None     Social Determinants of Health     Financial Resource Strain: Not on file   Food Insecurity: Not on file   Transportation Needs: No Transportation Needs (9/24/2022)    PRAPARE - Transportation    • Lack of Transportation (Medical): No    • Lack of Transportation (Non-Medical):  No   Physical Activity: Not on file   Stress: Not on file   Social Connections: Not on file   Intimate Partner Violence: Not on file   Housing Stability: Not on file      Family History:     Family History   Problem Relation Age of Onset   • No Known Problems Mother       Current Medications:     Current Outpatient Medications Medication Sig Dispense Refill   • desogestrel-ethinyl estradiol (APRI) 0.15-30 MG-MCG per tablet Take 1 tablet by mouth every morning (Patient not taking: Reported on 6/28/2023)       No current facility-administered medications for this visit. Allergies: Allergies   Allergen Reactions   • Diphenhydramine Hives      Physical Exam:     /74 (BP Location: Left arm, Patient Position: Sitting, Cuff Size: Standard)   Pulse 69   Temp 98.2 °F (36.8 °C) (Temporal)   Resp 18   Ht 5' 5" (1.651 m)   Wt 59.5 kg (131 lb 3.2 oz)   LMP 06/23/2023 (Exact Date)   SpO2 99%   BMI 21.83 kg/m²     Physical Exam  Vitals and nursing note reviewed. Constitutional:       General: She is not in acute distress. Appearance: She is well-developed. HENT:      Head: Normocephalic and atraumatic. Right Ear: Tympanic membrane, ear canal and external ear normal.      Left Ear: Tympanic membrane, ear canal and external ear normal.      Mouth/Throat:      Mouth: Mucous membranes are moist.      Pharynx: Oropharynx is clear. Eyes:      Conjunctiva/sclera: Conjunctivae normal.      Pupils: Pupils are equal, round, and reactive to light. Cardiovascular:      Rate and Rhythm: Normal rate and regular rhythm. Pulses: Normal pulses. Heart sounds: Normal heart sounds. No murmur heard. Pulmonary:      Effort: Pulmonary effort is normal. No respiratory distress. Breath sounds: Normal breath sounds. Abdominal:      General: Abdomen is flat. Bowel sounds are normal.      Palpations: Abdomen is soft. Tenderness: There is no abdominal tenderness. Musculoskeletal:         General: No swelling. Cervical back: Neck supple. Skin:     General: Skin is warm and dry. Capillary Refill: Capillary refill takes less than 2 seconds. Neurological:      Mental Status: She is alert and oriented to person, place, and time.    Psychiatric:         Mood and Affect: Mood normal.         Behavior: Behavior normal.         Thought Content:  Thought content normal.         Judgment: Judgment normal.          Richard Márquez, DO   [address]

## 2023-06-30 ENCOUNTER — VBI (OUTPATIENT)
Dept: ADMINISTRATIVE | Facility: OTHER | Age: 28
End: 2023-06-30

## 2023-06-30 ENCOUNTER — APPOINTMENT (OUTPATIENT)
Dept: LAB | Facility: HOSPITAL | Age: 28
End: 2023-06-30
Payer: COMMERCIAL

## 2023-06-30 DIAGNOSIS — Z11.59 SCREENING FOR VIRAL DISEASE: ICD-10-CM

## 2023-06-30 DIAGNOSIS — Z00.00 ANNUAL PHYSICAL EXAM: ICD-10-CM

## 2023-06-30 DIAGNOSIS — Z00.8 HEALTH EXAMINATION IN POPULATION SURVEY: ICD-10-CM

## 2023-06-30 DIAGNOSIS — E03.9 ACQUIRED HYPOTHYROIDISM: ICD-10-CM

## 2023-06-30 LAB
ANION GAP SERPL CALCULATED.3IONS-SCNC: 6 MMOL/L
BASOPHILS # BLD AUTO: 0.09 THOUSANDS/ÂΜL (ref 0–0.1)
BASOPHILS NFR BLD AUTO: 2 % (ref 0–1)
BUN SERPL-MCNC: 22 MG/DL (ref 5–25)
CALCIUM SERPL-MCNC: 9.6 MG/DL (ref 8.4–10.2)
CHLORIDE SERPL-SCNC: 104 MMOL/L (ref 96–108)
CHOLEST SERPL-MCNC: 224 MG/DL
CO2 SERPL-SCNC: 27 MMOL/L (ref 21–32)
CREAT SERPL-MCNC: 0.69 MG/DL (ref 0.6–1.3)
EOSINOPHIL # BLD AUTO: 0.16 THOUSAND/ÂΜL (ref 0–0.61)
EOSINOPHIL NFR BLD AUTO: 3 % (ref 0–6)
ERYTHROCYTE [DISTWIDTH] IN BLOOD BY AUTOMATED COUNT: 12.3 % (ref 11.6–15.1)
EST. AVERAGE GLUCOSE BLD GHB EST-MCNC: 91 MG/DL
GFR SERPL CREATININE-BSD FRML MDRD: 121 ML/MIN/1.73SQ M
GLUCOSE P FAST SERPL-MCNC: 94 MG/DL (ref 65–99)
HBA1C MFR BLD: 4.8 %
HCT VFR BLD AUTO: 39.8 % (ref 34.8–46.1)
HCV AB SER QL: NORMAL
HDLC SERPL-MCNC: 104 MG/DL
HGB BLD-MCNC: 12.6 G/DL (ref 11.5–15.4)
HIV 1+2 AB+HIV1 P24 AG SERPL QL IA: NORMAL
HIV 2 AB SERPL QL IA: NORMAL
HIV1 AB SERPL QL IA: NORMAL
HIV1 P24 AG SERPL QL IA: NORMAL
IMM GRANULOCYTES # BLD AUTO: 0.02 THOUSAND/UL (ref 0–0.2)
IMM GRANULOCYTES NFR BLD AUTO: 0 % (ref 0–2)
LDLC SERPL CALC-MCNC: 109 MG/DL (ref 0–100)
LYMPHOCYTES # BLD AUTO: 2.63 THOUSANDS/ÂΜL (ref 0.6–4.47)
LYMPHOCYTES NFR BLD AUTO: 45 % (ref 14–44)
MCH RBC QN AUTO: 29.4 PG (ref 26.8–34.3)
MCHC RBC AUTO-ENTMCNC: 31.7 G/DL (ref 31.4–37.4)
MCV RBC AUTO: 93 FL (ref 82–98)
MONOCYTES # BLD AUTO: 0.42 THOUSAND/ÂΜL (ref 0.17–1.22)
MONOCYTES NFR BLD AUTO: 7 % (ref 4–12)
NEUTROPHILS # BLD AUTO: 2.51 THOUSANDS/ÂΜL (ref 1.85–7.62)
NEUTS SEG NFR BLD AUTO: 43 % (ref 43–75)
NONHDLC SERPL-MCNC: 120 MG/DL
NRBC BLD AUTO-RTO: 0 /100 WBCS
PLATELET # BLD AUTO: 260 THOUSANDS/UL (ref 149–390)
PMV BLD AUTO: 10.8 FL (ref 8.9–12.7)
POTASSIUM SERPL-SCNC: 3.9 MMOL/L (ref 3.5–5.3)
RBC # BLD AUTO: 4.28 MILLION/UL (ref 3.81–5.12)
SODIUM SERPL-SCNC: 137 MMOL/L (ref 135–147)
TRIGL SERPL-MCNC: 53 MG/DL
TSH SERPL DL<=0.05 MIU/L-ACNC: 3.23 UIU/ML (ref 0.45–4.5)
WBC # BLD AUTO: 5.83 THOUSAND/UL (ref 4.31–10.16)

## 2023-06-30 PROCEDURE — 84443 ASSAY THYROID STIM HORMONE: CPT

## 2023-06-30 PROCEDURE — 80061 LIPID PANEL: CPT

## 2023-06-30 PROCEDURE — 86803 HEPATITIS C AB TEST: CPT

## 2023-06-30 PROCEDURE — 83036 HEMOGLOBIN GLYCOSYLATED A1C: CPT

## 2023-06-30 PROCEDURE — 80048 BASIC METABOLIC PNL TOTAL CA: CPT

## 2023-06-30 PROCEDURE — 85025 COMPLETE CBC W/AUTO DIFF WBC: CPT

## 2023-06-30 PROCEDURE — 36415 COLL VENOUS BLD VENIPUNCTURE: CPT

## 2023-06-30 PROCEDURE — 87389 HIV-1 AG W/HIV-1&-2 AB AG IA: CPT

## 2023-08-01 ENCOUNTER — OFFICE VISIT (OUTPATIENT)
Dept: OBGYN CLINIC | Facility: CLINIC | Age: 28
End: 2023-08-01
Payer: COMMERCIAL

## 2023-08-01 VITALS
WEIGHT: 130 LBS | DIASTOLIC BLOOD PRESSURE: 74 MMHG | SYSTOLIC BLOOD PRESSURE: 118 MMHG | BODY MASS INDEX: 21.66 KG/M2 | HEIGHT: 65 IN

## 2023-08-01 DIAGNOSIS — Z01.419 ENCOUNTER FOR ANNUAL ROUTINE GYNECOLOGICAL EXAMINATION: Primary | ICD-10-CM

## 2023-08-01 PROBLEM — Z30.41 USES ORAL CONTRACEPTION: Status: RESOLVED | Noted: 2022-09-24 | Resolved: 2023-08-01

## 2023-08-01 PROCEDURE — S0610 ANNUAL GYNECOLOGICAL EXAMINA: HCPCS | Performed by: OBSTETRICS & GYNECOLOGY

## 2023-08-01 PROCEDURE — G0143 SCR C/V CYTO,THINLAYER,RESCR: HCPCS | Performed by: OBSTETRICS & GYNECOLOGY

## 2023-08-01 NOTE — PROGRESS NOTES
Subjective      Eugene Duval is a 34 y.o. female who presents for annual well woman exam.     GYN:  · Menses are regular, q 32 days, lasting 5 days. No intermenstrual bleeding, or spotting. She stopped her birth control last September. Her birth control was causing anxiety and palpitations. · She denies vaginal discharge, labial erythema or lesions, dyspareunia. · Contraception: Condoms. · Patient is sexually active with fiance (soon to be )  · Getting  in September!!    OB:  OB History    Para Term  AB Living   0 0 0 0 0 0   SAB IAB Ectopic Multiple Live Births   0 0 0 0 0     :  · She denies dysuria, urinary frequency or urgency. · She denies hematuria, flank pain, incontinence. Breast:  · She denies breast mass, skin changes, dimpling, reddening, nipple retraction. · She denies breast discharge. · Patient does not have a family history of breast, endometrial, colon, or ovarian ca. Cancer-related family history includes Skin cancer in her paternal grandfather. History reviewed. No pertinent past medical history. History reviewed. No pertinent surgical history. General:  · Diet: Good  · Exercise: Runs at the gym  · Work: [employer]    Social History     Tobacco Use   • Smoking status: Never   • Smokeless tobacco: Never   Vaping Use   • Vaping Use: Never used   Substance Use Topics   • Alcohol use: Yes     Comment: social   • Drug use: Never       Screening:  · Cervical cancer: last pap smear in 2020 Results were unavailable for review but per patient was normal.  · Breast cancer: Not yet indicated  · Colon cancer: Not yet indicated  · STD screening: None. Review of Systems   Constitutional: Negative for appetite change, chills and fever. HENT: Negative for trouble swallowing. Respiratory: Negative for shortness of breath. Cardiovascular: Negative for chest pain.    Gastrointestinal: Negative for abdominal pain, constipation, diarrhea, nausea and vomiting. Genitourinary: Negative for decreased urine volume, difficulty urinating, dyspareunia, dysuria, flank pain, frequency, genital sores, hematuria, menstrual problem, pelvic pain, urgency, vaginal bleeding, vaginal discharge and vaginal pain. Objective      /74 (BP Location: Left arm, Patient Position: Sitting, Cuff Size: Standard)   Ht 5' 5" (1.651 m)   Wt 59 kg (130 lb)   LMP 07/26/2023   BMI 21.63 kg/m²   Physical Exam  Vitals reviewed. Constitutional:       General: She is not in acute distress. Appearance: Normal appearance. She is well-developed. She is not ill-appearing, toxic-appearing or diaphoretic. Neck:      Thyroid: No thyroid mass, thyromegaly or thyroid tenderness. Cardiovascular:      Rate and Rhythm: Normal rate and regular rhythm. Heart sounds: Normal heart sounds. No murmur heard. No friction rub. No gallop. Pulmonary:      Effort: Pulmonary effort is normal. No respiratory distress. Breath sounds: Normal breath sounds. No stridor. No wheezing, rhonchi or rales. Chest:      Chest wall: No tenderness. Breasts:     Breasts are symmetrical.      Right: No swelling, bleeding, inverted nipple, mass, nipple discharge, skin change or tenderness. Left: No swelling, bleeding, inverted nipple, mass, nipple discharge, skin change or tenderness. Abdominal:      General: There is no distension. Palpations: Abdomen is soft. Tenderness: There is no abdominal tenderness. Genitourinary:     General: Normal vulva. Exam position: Lithotomy position. Labia:         Right: No rash, tenderness, lesion or injury. Left: No rash, tenderness, lesion or injury. Urethra: No prolapse or urethral lesion. Vagina: Normal. No signs of injury and foreign body. No vaginal discharge, erythema, tenderness, bleeding, lesions or prolapsed vaginal walls.       Cervix: No cervical motion tenderness, discharge, friability, lesion, erythema, cervical bleeding or eversion. Uterus: Not deviated, not enlarged, not fixed, not tender and no uterine prolapse. Adnexa:         Right: No mass, tenderness or fullness. Left: No mass, tenderness or fullness. Rectum: No external hemorrhoid. Lymphadenopathy:      Cervical: No cervical adenopathy. Upper Body:      Right upper body: No supraclavicular, axillary or pectoral adenopathy. Left upper body: No supraclavicular, axillary or pectoral adenopathy. Skin:     General: Skin is warm and dry. Neurological:      Mental Status: She is alert and oriented to person, place, and time. Psychiatric:         Behavior: Behavior normal. Behavior is cooperative. Assessment/Plan  Problem List Items Addressed This Visit        Unprioritized    Encounter for annual routine gynecological examination - Primary     No GYN concerns today  Birth control: Declined; Condoms  Cervical cancer screening: Collected today  Breast Cancer screening: Due age 38  Colon cancer Screening: Due age 41  STD screening: Declined  Reviewed healthy lifestyle and safe sex practices  RTO for annual exam or PRN             Relevant Orders    Liquid-based pap, screening       Desiree Calero MD  OB/GYN  8/1/2023  12:52 PM

## 2023-08-01 NOTE — ASSESSMENT & PLAN NOTE
No GYN concerns today  Birth control: Declined; Condoms  Cervical cancer screening: Collected today  Breast Cancer screening: Due age 38  Colon cancer Screening: Due age 41  STD screening: Declined  Reviewed healthy lifestyle and safe sex practices  RTO for annual exam or PRN

## 2023-08-08 LAB
LAB AP GYN PRIMARY INTERPRETATION: NORMAL
LAB AP LMP: NORMAL
Lab: NORMAL
PATH INTERP SPEC-IMP: NORMAL